# Patient Record
Sex: MALE | Race: WHITE | NOT HISPANIC OR LATINO | URBAN - METROPOLITAN AREA
[De-identification: names, ages, dates, MRNs, and addresses within clinical notes are randomized per-mention and may not be internally consistent; named-entity substitution may affect disease eponyms.]

---

## 2020-11-05 ENCOUNTER — OFFICE VISIT (OUTPATIENT)
Dept: URBAN - METROPOLITAN AREA TELEHEALTH 2 | Facility: TELEHEALTH | Age: 65
End: 2020-11-05

## 2021-03-05 ENCOUNTER — OFFICE VISIT (OUTPATIENT)
Dept: URBAN - METROPOLITAN AREA SURGERY CENTER 4 | Facility: SURGERY CENTER | Age: 66
End: 2021-03-05

## 2021-03-25 ENCOUNTER — OFFICE VISIT (OUTPATIENT)
Dept: URBAN - METROPOLITAN AREA TELEHEALTH 2 | Facility: TELEHEALTH | Age: 66
End: 2021-03-25

## 2021-04-06 ENCOUNTER — OFFICE VISIT (OUTPATIENT)
Dept: URBAN - METROPOLITAN AREA SURGERY CENTER 4 | Facility: SURGERY CENTER | Age: 66
End: 2021-04-06

## 2021-04-07 LAB — PATHOLOGY (INDENTED REPORT): (no result)

## 2021-04-22 ENCOUNTER — OFFICE VISIT (OUTPATIENT)
Dept: URBAN - METROPOLITAN AREA TELEHEALTH 2 | Facility: TELEHEALTH | Age: 66
End: 2021-04-22

## 2021-04-23 ENCOUNTER — OFFICE VISIT (OUTPATIENT)
Dept: URBAN - METROPOLITAN AREA SURGERY CENTER 4 | Facility: SURGERY CENTER | Age: 66
End: 2021-04-23

## 2022-07-09 ENCOUNTER — TELEPHONE ENCOUNTER (OUTPATIENT)
Dept: URBAN - METROPOLITAN AREA CLINIC 121 | Facility: CLINIC | Age: 67
End: 2022-07-09

## 2022-07-09 RX ORDER — MV-MIN/FOLIC/VIT K/LYCOP/COQ10 200-100MCG
CAPSULE ORAL
Refills: 0 | OUTPATIENT
Start: 2019-04-08 | End: 2019-07-23

## 2022-07-09 RX ORDER — CAMPHOR 0.45 %
GEL (GRAM) TOPICAL
Refills: 0 | OUTPATIENT
Start: 2019-04-08 | End: 2019-07-23

## 2022-07-10 ENCOUNTER — TELEPHONE ENCOUNTER (OUTPATIENT)
Dept: URBAN - METROPOLITAN AREA CLINIC 121 | Facility: CLINIC | Age: 67
End: 2022-07-10

## 2022-07-10 RX ORDER — CAMPHOR 0.45 %
GEL (GRAM) TOPICAL
Refills: 0 | Status: ACTIVE | COMMUNITY
Start: 2019-07-23

## 2022-07-10 RX ORDER — NAPROXEN SODIUM 220 MG
ONCE A DAY TABLET ORAL ONCE A DAY
Refills: 1 | Status: ACTIVE | COMMUNITY
Start: 2019-04-08

## 2022-07-10 RX ORDER — ONDANSETRON HYDROCHLORIDE 4 MG/1
USE AS DIRECTED TAKE ONE TABLET 30 MINUTES BEFORE EACH BOTTLE OF MAGNESIUM CITRATE FOR COLONOSCOPY PREP TABLET, FILM COATED ORAL
Refills: 0 | Status: ACTIVE | COMMUNITY
Start: 2021-02-04

## 2022-07-10 RX ORDER — OMEPRAZOLE 20 MG/1
ONCE A DAY; ONE TABLET 30MIN BEFORE BREAKFAST TABLET, DELAYED RELEASE ORAL ONCE A DAY
Refills: 1 | Status: ACTIVE | COMMUNITY
Start: 2019-06-07

## 2022-07-10 RX ORDER — FAMOTIDINE 10 MG
TWICE A DAY TABLET ORAL TWICE A DAY
Refills: 1 | Status: ACTIVE | COMMUNITY
Start: 2019-04-08

## 2022-07-10 RX ORDER — OMEPRAZOLE 20 MG/1
ONCE A DAY CAPSULE, DELAYED RELEASE ORAL ONCE A DAY
Refills: 0 | Status: ACTIVE | COMMUNITY
Start: 2021-04-22

## 2022-07-10 RX ORDER — OMEPRAZOLE 20 MG/1
ONCE A DAY CAPSULE, DELAYED RELEASE ORAL ONCE A DAY
Refills: 1 | Status: ACTIVE | COMMUNITY
Start: 2021-04-07

## 2022-07-10 RX ORDER — MV-MIN/FOLIC/VIT K/LYCOP/COQ10 200-100MCG
CAPSULE ORAL
Refills: 0 | Status: ACTIVE | COMMUNITY
Start: 2019-07-23

## 2022-10-13 ENCOUNTER — OFFICE VISIT (OUTPATIENT)
Dept: URBAN - METROPOLITAN AREA CLINIC 63 | Facility: CLINIC | Age: 67
End: 2022-10-13
Payer: COMMERCIAL

## 2022-10-13 ENCOUNTER — WEB ENCOUNTER (OUTPATIENT)
Dept: URBAN - METROPOLITAN AREA CLINIC 63 | Facility: CLINIC | Age: 67
End: 2022-10-13

## 2022-10-13 VITALS
HEART RATE: 85 BPM | BODY MASS INDEX: 26.26 KG/M2 | SYSTOLIC BLOOD PRESSURE: 115 MMHG | WEIGHT: 187.6 LBS | HEIGHT: 71 IN | TEMPERATURE: 98 F | OXYGEN SATURATION: 95 % | DIASTOLIC BLOOD PRESSURE: 70 MMHG

## 2022-10-13 DIAGNOSIS — R10.13 DYSPEPSIA: ICD-10-CM

## 2022-10-13 DIAGNOSIS — K20.90 ESOPHAGITIS: ICD-10-CM

## 2022-10-13 DIAGNOSIS — K31.89 GASTRIC NODULE: ICD-10-CM

## 2022-10-13 PROCEDURE — 99213 OFFICE O/P EST LOW 20 MIN: CPT | Performed by: INTERNAL MEDICINE

## 2022-10-13 RX ORDER — CAMPHOR 0.45 %
GEL (GRAM) TOPICAL
Refills: 0 | Status: ACTIVE | COMMUNITY
Start: 2019-07-23

## 2022-10-13 RX ORDER — MV-MIN/FOLIC/VIT K/LYCOP/COQ10 200-100MCG
CAPSULE ORAL
Refills: 0 | Status: ACTIVE | COMMUNITY
Start: 2019-07-23

## 2022-10-13 RX ORDER — OMEPRAZOLE 10 MG/1
1 CAPSULE 30 MINUTES BEFORE MORNING MEAL CAPSULE, DELAYED RELEASE ORAL ONCE A DAY
Qty: 30 | OUTPATIENT
Start: 2022-10-13

## 2022-10-13 RX ORDER — FAMOTIDINE 20 MG/1
1 TABLET AT BEDTIME AS NEEDED TABLET, FILM COATED ORAL ONCE A DAY
Status: ACTIVE | COMMUNITY

## 2022-10-13 NOTE — HPI-TODAY'S VISIT:
Buddy is a pleasant 67-year-old male who presents today for follow-up.  EGD was performed in light of a submucosal nodule noted on EGD in May 2016 with another gastroenterology group but operative report was not available for review.  Last visit noted straining with bowel movements.  Recommend he continue omeprazole 20 mg for total 2 months then wean off and switch to Pepcid as needed.  Labs dated 2/26/2021 showed a negative hepatitis panel  FibroScan dated 4/2/2021 demonstrated F0/F1 and S0  EGD dated 4/6/2021 revealed LA grade A esophagitis.  A few GE junction polyps biopsied.  A few gastric polyps biopsied.  Small hiatal hernia.  Normal third portion of the duodenum.  A single submucosal papule (nodule) found in the stomach.  Probable lipoma - positive pillow sign. Antrum/body biopsies negative for H. pylori.  Gastric polyp significant for focal mild foveolar glandular hyperplasia.  GE junction biopsies benign.  GE junction polyp again noted for focal mild foveolar glandular hyperplasia.  Remaining biopsies otherwise unremarkable or benign  Colonoscopy in May 2021 showed diverticulosis in the ascending colon.  Nonbleeding internal hemorrhoids.  No specimens collected.  Placed into a 5-year recall  Patient states while taking pepcid 20mg daily  experiencing epigastric pain  described as burning.  Denies dysphagia, melena, rectal bleeding. States started omeprazole 10mg daily( cut 20mg in half)

## 2023-01-12 ENCOUNTER — OFFICE VISIT (OUTPATIENT)
Dept: URBAN - METROPOLITAN AREA CLINIC 63 | Facility: CLINIC | Age: 68
End: 2023-01-12

## 2023-02-02 ENCOUNTER — OFFICE VISIT (OUTPATIENT)
Dept: URBAN - METROPOLITAN AREA CLINIC 63 | Facility: CLINIC | Age: 68
End: 2023-02-02

## 2023-02-22 ENCOUNTER — OFFICE VISIT (OUTPATIENT)
Dept: URBAN - METROPOLITAN AREA CLINIC 63 | Facility: CLINIC | Age: 68
End: 2023-02-22

## 2023-02-22 RX ORDER — FAMOTIDINE 20 MG/1
1 TABLET AT BEDTIME AS NEEDED TABLET, FILM COATED ORAL ONCE A DAY
COMMUNITY

## 2023-02-22 RX ORDER — OMEPRAZOLE 10 MG/1
1 CAPSULE 30 MINUTES BEFORE MORNING MEAL CAPSULE, DELAYED RELEASE ORAL ONCE A DAY
Qty: 30 | COMMUNITY
Start: 2022-10-13

## 2023-02-22 RX ORDER — CAMPHOR 0.45 %
GEL (GRAM) TOPICAL
Refills: 0 | COMMUNITY
Start: 2019-07-23

## 2023-02-22 RX ORDER — MV-MIN/FOLIC/VIT K/LYCOP/COQ10 200-100MCG
CAPSULE ORAL
Refills: 0 | COMMUNITY
Start: 2019-07-23

## 2023-02-22 NOTE — HPI-SCREENING
Buddy is a pleasant 67-year-old male who presents today for follow-up.  Previous EGD was performed in light of a submucosal nodule noted on EGD in May 2016 with another gastroenterology group but operative report was not available for review.  Last visit noted while taking Pepcid 20 mg daily experiencing epigastric pain described as burning.  Started omeprazole 10 mg daily with efficacy.  Labs dated 2/26/2021 showed a negative hepatitis panel  FibroScan dated 4/2/2021 demonstrated F0/F1 and S0  EGD dated 4/6/2021 revealed LA grade A esophagitis.  A few GE junction polyps biopsied.  A few gastric polyps biopsied.  Small hiatal hernia.  Normal third portion of the duodenum.  A single submucosal papule (nodule) found in the stomach.  Probable lipoma - positive pillow sign. Antrum/body biopsies negative for H. pylori.  Gastric polyp significant for focal mild foveolar glandular hyperplasia.  GE junction biopsies benign.  GE junction polyp again noted for focal mild foveolar glandular hyperplasia.  Remaining biopsies otherwise unremarkable or benign  Colonoscopy in May 2021 showed diverticulosis in the ascending colon.  Nonbleeding internal hemorrhoids.  No specimens collected.  Placed into a 5-year recall

## 2023-03-23 ENCOUNTER — OFFICE VISIT (OUTPATIENT)
Dept: URBAN - METROPOLITAN AREA CLINIC 63 | Facility: CLINIC | Age: 68
End: 2023-03-23

## 2023-03-23 RX ORDER — FAMOTIDINE 20 MG/1
1 TABLET AT BEDTIME AS NEEDED TABLET, FILM COATED ORAL ONCE A DAY
Status: ACTIVE | COMMUNITY

## 2023-03-23 RX ORDER — MV-MIN/FOLIC/VIT K/LYCOP/COQ10 200-100MCG
CAPSULE ORAL
Refills: 0 | Status: ACTIVE | COMMUNITY
Start: 2019-07-23

## 2023-03-23 RX ORDER — CAMPHOR 0.45 %
GEL (GRAM) TOPICAL
Refills: 0 | Status: ACTIVE | COMMUNITY
Start: 2019-07-23

## 2023-03-23 RX ORDER — OMEPRAZOLE 10 MG/1
1 CAPSULE 30 MINUTES BEFORE MORNING MEAL CAPSULE, DELAYED RELEASE ORAL ONCE A DAY
Qty: 30 | Status: ACTIVE | COMMUNITY
Start: 2022-10-13

## 2023-04-19 ENCOUNTER — OFFICE VISIT (OUTPATIENT)
Dept: URBAN - METROPOLITAN AREA CLINIC 63 | Facility: CLINIC | Age: 68
End: 2023-04-19

## 2023-04-19 RX ORDER — OMEPRAZOLE 10 MG/1
1 CAPSULE 30 MINUTES BEFORE MORNING MEAL CAPSULE, DELAYED RELEASE ORAL ONCE A DAY
Qty: 30 | Status: ACTIVE | COMMUNITY
Start: 2022-10-13

## 2023-04-19 RX ORDER — CAMPHOR 0.45 %
GEL (GRAM) TOPICAL
Refills: 0 | Status: ACTIVE | COMMUNITY
Start: 2019-07-23

## 2023-04-19 RX ORDER — FAMOTIDINE 20 MG/1
1 TABLET AT BEDTIME AS NEEDED TABLET, FILM COATED ORAL ONCE A DAY
Status: ACTIVE | COMMUNITY

## 2023-04-19 RX ORDER — MV-MIN/FOLIC/VIT K/LYCOP/COQ10 200-100MCG
CAPSULE ORAL
Refills: 0 | Status: ACTIVE | COMMUNITY
Start: 2019-07-23

## 2023-05-18 ENCOUNTER — OFFICE VISIT (OUTPATIENT)
Dept: URBAN - METROPOLITAN AREA CLINIC 63 | Facility: CLINIC | Age: 68
End: 2023-05-18

## 2023-05-18 RX ORDER — CAMPHOR 0.45 %
GEL (GRAM) TOPICAL
Refills: 0 | Status: ACTIVE | COMMUNITY
Start: 2019-07-23

## 2023-05-18 RX ORDER — OMEPRAZOLE 10 MG/1
1 CAPSULE 30 MINUTES BEFORE MORNING MEAL CAPSULE, DELAYED RELEASE ORAL ONCE A DAY
Qty: 30 | Status: ACTIVE | COMMUNITY
Start: 2022-10-13

## 2023-05-18 RX ORDER — MV-MIN/FOLIC/VIT K/LYCOP/COQ10 200-100MCG
CAPSULE ORAL
Refills: 0 | Status: ACTIVE | COMMUNITY
Start: 2019-07-23

## 2023-05-18 RX ORDER — FAMOTIDINE 20 MG/1
1 TABLET AT BEDTIME AS NEEDED TABLET, FILM COATED ORAL ONCE A DAY
Status: ACTIVE | COMMUNITY

## 2023-05-31 ENCOUNTER — OFFICE VISIT (OUTPATIENT)
Dept: URBAN - METROPOLITAN AREA CLINIC 63 | Facility: CLINIC | Age: 68
End: 2023-05-31

## 2023-05-31 RX ORDER — MV-MIN/FOLIC/VIT K/LYCOP/COQ10 200-100MCG
CAPSULE ORAL
Refills: 0 | Status: ACTIVE | COMMUNITY
Start: 2019-07-23

## 2023-05-31 RX ORDER — CAMPHOR 0.45 %
GEL (GRAM) TOPICAL
Refills: 0 | Status: ACTIVE | COMMUNITY
Start: 2019-07-23

## 2023-05-31 RX ORDER — OMEPRAZOLE 10 MG/1
1 CAPSULE 30 MINUTES BEFORE MORNING MEAL CAPSULE, DELAYED RELEASE ORAL ONCE A DAY
Qty: 30 | Status: ACTIVE | COMMUNITY
Start: 2022-10-13

## 2023-05-31 RX ORDER — FAMOTIDINE 20 MG/1
1 TABLET AT BEDTIME AS NEEDED TABLET, FILM COATED ORAL ONCE A DAY
Status: ACTIVE | COMMUNITY

## 2023-07-19 ENCOUNTER — OFFICE VISIT (OUTPATIENT)
Dept: URBAN - METROPOLITAN AREA CLINIC 63 | Facility: CLINIC | Age: 68
End: 2023-07-19
Payer: COMMERCIAL

## 2023-07-19 ENCOUNTER — WEB ENCOUNTER (OUTPATIENT)
Dept: URBAN - METROPOLITAN AREA CLINIC 63 | Facility: CLINIC | Age: 68
End: 2023-07-19

## 2023-07-19 ENCOUNTER — LAB OUTSIDE AN ENCOUNTER (OUTPATIENT)
Dept: URBAN - METROPOLITAN AREA CLINIC 63 | Facility: CLINIC | Age: 68
End: 2023-07-19

## 2023-07-19 VITALS
TEMPERATURE: 97.2 F | HEIGHT: 71 IN | WEIGHT: 187 LBS | SYSTOLIC BLOOD PRESSURE: 110 MMHG | DIASTOLIC BLOOD PRESSURE: 60 MMHG | HEART RATE: 75 BPM | BODY MASS INDEX: 26.18 KG/M2 | OXYGEN SATURATION: 94 %

## 2023-07-19 DIAGNOSIS — R12 HEARTBURN: ICD-10-CM

## 2023-07-19 DIAGNOSIS — R16.0 HEPATOMEGALY: ICD-10-CM

## 2023-07-19 PROBLEM — 197321007: Status: ACTIVE | Noted: 2023-07-19

## 2023-07-19 PROCEDURE — 99213 OFFICE O/P EST LOW 20 MIN: CPT | Performed by: INTERNAL MEDICINE

## 2023-07-19 RX ORDER — FAMOTIDINE 20 MG/1
1 TABLET AT BEDTIME AS NEEDED TABLET, FILM COATED ORAL ONCE A DAY
Status: ACTIVE | COMMUNITY

## 2023-07-19 RX ORDER — CAMPHOR 0.45 %
GEL (GRAM) TOPICAL
Refills: 0 | Status: DISCONTINUED | COMMUNITY
Start: 2019-07-23

## 2023-07-19 RX ORDER — FAMOTIDINE 20 MG/1
1 TABLET BEFORE BREAKFAST AND ONE TABLET AT NIGHT TABLET, FILM COATED ORAL
Qty: 30 | Refills: 3 | OUTPATIENT
Start: 2023-07-19

## 2023-07-19 RX ORDER — OMEPRAZOLE 10 MG/1
1 CAPSULE 30 MINUTES BEFORE MORNING MEAL CAPSULE, DELAYED RELEASE ORAL ONCE A DAY
Qty: 30 | Status: ACTIVE | COMMUNITY
Start: 2022-10-13

## 2023-07-19 RX ORDER — MV-MIN/FOLIC/VIT K/LYCOP/COQ10 200-100MCG
CAPSULE ORAL
Refills: 0 | Status: ACTIVE | COMMUNITY
Start: 2019-07-23

## 2023-07-19 NOTE — HPI-TODAY'S VISIT:
Buddy is a pleasant 67-year-old male who presents today for follow-up.  Previous EGD was performed in light of a submucosal nodule noted on EGD in May 2016 with another gastroenterology group but operative report was not available for review.  Last visit noted while taking Pepcid 20 mg daily experiencing epigastric pain described as burning.  Started omeprazole 10 mg daily with efficacy.  Labs dated 2/26/2021 showed a negative hepatitis panel  FibroScan dated 4/2/2021 demonstrated F0/F1 and S0  EGD dated 4/6/2021 revealed LA grade A esophagitis.  A few GE junction polyps biopsied.  A few gastric polyps biopsied.  Small hiatal hernia.  Normal third portion of the duodenum.  A single submucosal papule (nodule) found in the stomach.  Probable lipoma - positive pillow sign. Antrum/body biopsies negative for H. pylori.  Gastric polyp significant for focal mild foveolar glandular hyperplasia.  GE junction biopsies benign.  GE junction polyp again noted for focal mild foveolar glandular hyperplasia.  Remaining biopsies otherwise unremarkable or benign  Colonoscopy in May 2021 showed diverticulosis in the ascending colon.  Nonbleeding internal hemorrhoids.  No specimens collected.  Placed into a 5-year recall  Patient states over the past few weeks more heartburn in the night before bedtime.  Takes omeprazole 10mg daily  alternating with pepcid 20mg in the am .Takes  pepcid daily.   Does admit to more straining over the past 2 weeks he atributes to dietary changes in attempt to lose weight.  DEnies rectal bleeding, dysphagia or wt loss

## 2023-09-16 PROBLEM — 266433003: Status: ACTIVE | Noted: 2023-09-16

## 2023-09-20 ENCOUNTER — OFFICE VISIT (OUTPATIENT)
Dept: URBAN - METROPOLITAN AREA CLINIC 63 | Facility: CLINIC | Age: 68
End: 2023-09-20
Payer: COMMERCIAL

## 2023-09-20 VITALS
WEIGHT: 192.8 LBS | HEART RATE: 71 BPM | SYSTOLIC BLOOD PRESSURE: 110 MMHG | OXYGEN SATURATION: 95 % | DIASTOLIC BLOOD PRESSURE: 70 MMHG | BODY MASS INDEX: 26.99 KG/M2 | HEIGHT: 71 IN | TEMPERATURE: 98.2 F

## 2023-09-20 DIAGNOSIS — K31.89 GASTRIC NODULE: ICD-10-CM

## 2023-09-20 DIAGNOSIS — K76.0 FATTY LIVER: ICD-10-CM

## 2023-09-20 DIAGNOSIS — K21.00 GASTROESOPHAGEAL REFLUX DISEASE WITH ESOPHAGITIS WITHOUT HEMORRHAGE: ICD-10-CM

## 2023-09-20 PROCEDURE — 99213 OFFICE O/P EST LOW 20 MIN: CPT | Performed by: INTERNAL MEDICINE

## 2023-09-20 RX ORDER — MV-MIN/FOLIC/VIT K/LYCOP/COQ10 200-100MCG
CAPSULE ORAL
Refills: 0 | Status: ACTIVE | COMMUNITY
Start: 2019-07-23

## 2023-09-20 RX ORDER — FAMOTIDINE 20 MG/1
1 TABLET BEFORE BREAKFAST AND ONE TABLET AT NIGHT TABLET, FILM COATED ORAL
Qty: 30 | Refills: 3 | Status: ACTIVE | COMMUNITY
Start: 2023-07-19

## 2023-09-20 RX ORDER — FAMOTIDINE 20 MG/1
1 TABLET AT BEDTIME AS NEEDED TABLET, FILM COATED ORAL ONCE A DAY
Status: ACTIVE | COMMUNITY

## 2023-09-20 RX ORDER — OMEPRAZOLE 10 MG/1
1 CAPSULE 30 MINUTES BEFORE MORNING MEAL CAPSULE, DELAYED RELEASE ORAL ONCE A DAY
Qty: 30 | Status: ACTIVE | COMMUNITY
Start: 2022-10-13

## 2023-09-20 NOTE — HPI-TODAY'S VISIT:
Buddy is a pleasant 68-year-old male who presents today for a follow-up.  Last visit noted a few weeks of increased heartburn at night before bedtime.  Taking omeprazole 10 mg daily alternating with Pepcid 20 mg in the morning.  Taking Pepcid daily.  Admitted to more straining as of late for which he attributed to dietary changes in attempts to lose weight.  Recommended famotidine before breakfast and nightly.  Recommend he discontinue PPI therapy.  Recommended high-fiber diet and Metamucil daily.  Labs dated 7/13/2023 showed a normal hepatic function panel.  Normal renal function.  FibroScan dated 8/18/2023 showed S2 and F0  Labs dated 2/26/2021 showed a negative hepatitis panel  EGD dated 4/6/2021 revealed LA grade A esophagitis.  A few GE junction polyps biopsied.  A few gastric polyps biopsied.  Small hiatal hernia.  Normal third portion of the duodenum.  A single submucosal papule (nodule) found in the stomach.  Probable lipoma - positive pillow sign. Antrum/body biopsies negative for H. pylori.  Gastric polyp significant for focal mild foveolar glandular hyperplasia.  GE junction biopsies benign.  GE junction polyp again noted for focal mild foveolar glandular hyperplasia.  Remaining biopsies otherwise unremarkable or benign  Colonoscopy in May 2021 showed diverticulosis in the ascending colon.  Nonbleeding internal hemorrhoids.  No specimens collected.  Placed into a 5-year recall Taking pepcid 20mg BID with good benefit though takes prn TUMs as well .Denies dysphagia.  REgular BM w/o rectal bleeding.

## 2024-05-20 ENCOUNTER — TELEPHONE ENCOUNTER (OUTPATIENT)
Dept: URBAN - METROPOLITAN AREA CLINIC 63 | Facility: CLINIC | Age: 69
End: 2024-05-20

## 2024-05-22 ENCOUNTER — OFFICE VISIT (OUTPATIENT)
Dept: URBAN - METROPOLITAN AREA CLINIC 63 | Facility: CLINIC | Age: 69
End: 2024-05-22
Payer: MEDICARE

## 2024-05-22 ENCOUNTER — DASHBOARD ENCOUNTERS (OUTPATIENT)
Age: 69
End: 2024-05-22

## 2024-05-22 VITALS
HEART RATE: 72 BPM | TEMPERATURE: 97.2 F | DIASTOLIC BLOOD PRESSURE: 62 MMHG | RESPIRATION RATE: 20 BRPM | WEIGHT: 193 LBS | BODY MASS INDEX: 27.02 KG/M2 | OXYGEN SATURATION: 96 % | SYSTOLIC BLOOD PRESSURE: 100 MMHG | HEIGHT: 71 IN

## 2024-05-22 DIAGNOSIS — K21.00 GASTROESOPHAGEAL REFLUX DISEASE WITH ESOPHAGITIS WITHOUT HEMORRHAGE: ICD-10-CM

## 2024-05-22 DIAGNOSIS — K31.89 GASTRIC NODULE: ICD-10-CM

## 2024-05-22 DIAGNOSIS — R12 HEARTBURN: ICD-10-CM

## 2024-05-22 DIAGNOSIS — K76.0 FATTY LIVER: ICD-10-CM

## 2024-05-22 PROCEDURE — 99214 OFFICE O/P EST MOD 30 MIN: CPT | Performed by: INTERNAL MEDICINE

## 2024-05-22 RX ORDER — MV-MIN/FOLIC/VIT K/LYCOP/COQ10 200-100MCG
CAPSULE ORAL
Refills: 0 | Status: ACTIVE | COMMUNITY
Start: 2019-07-23

## 2024-05-22 RX ORDER — OMEPRAZOLE 10 MG/1
1 CAPSULE 30 MINUTES BEFORE MORNING MEAL CAPSULE, DELAYED RELEASE ORAL ONCE A DAY
Qty: 30 | Status: ACTIVE | COMMUNITY
Start: 2022-10-13

## 2024-05-22 RX ORDER — FAMOTIDINE 20 MG/1
1 TABLET AT BEDTIME AS NEEDED TABLET, FILM COATED ORAL ONCE A DAY
Status: ACTIVE | COMMUNITY

## 2024-05-22 RX ORDER — FAMOTIDINE 20 MG/1
1 TABLET BEFORE BREAKFAST AND ONE TABLET AT NIGHT TABLET, FILM COATED ORAL
Qty: 30 | Refills: 3 | Status: ACTIVE | COMMUNITY
Start: 2023-07-19

## 2025-07-11 ENCOUNTER — TELEPHONE ENCOUNTER (OUTPATIENT)
Dept: URBAN - METROPOLITAN AREA CLINIC 63 | Facility: CLINIC | Age: 70
End: 2025-07-11